# Patient Record
Sex: MALE | Race: WHITE | Employment: FULL TIME | URBAN - NONMETROPOLITAN AREA
[De-identification: names, ages, dates, MRNs, and addresses within clinical notes are randomized per-mention and may not be internally consistent; named-entity substitution may affect disease eponyms.]

---

## 2022-10-25 SDOH — HEALTH STABILITY: PHYSICAL HEALTH: ON AVERAGE, HOW MANY MINUTES DO YOU ENGAGE IN EXERCISE AT THIS LEVEL?: 60 MIN

## 2022-10-25 SDOH — HEALTH STABILITY: PHYSICAL HEALTH: ON AVERAGE, HOW MANY DAYS PER WEEK DO YOU ENGAGE IN MODERATE TO STRENUOUS EXERCISE (LIKE A BRISK WALK)?: 3 DAYS

## 2022-10-25 ASSESSMENT — SOCIAL DETERMINANTS OF HEALTH (SDOH)
WITHIN THE LAST YEAR, HAVE YOU BEEN KICKED, HIT, SLAPPED, OR OTHERWISE PHYSICALLY HURT BY YOUR PARTNER OR EX-PARTNER?: NO
WITHIN THE LAST YEAR, HAVE YOU BEEN AFRAID OF YOUR PARTNER OR EX-PARTNER?: NO
WITHIN THE LAST YEAR, HAVE TO BEEN RAPED OR FORCED TO HAVE ANY KIND OF SEXUAL ACTIVITY BY YOUR PARTNER OR EX-PARTNER?: NO
WITHIN THE LAST YEAR, HAVE YOU BEEN HUMILIATED OR EMOTIONALLY ABUSED IN OTHER WAYS BY YOUR PARTNER OR EX-PARTNER?: NO

## 2022-10-26 ENCOUNTER — OFFICE VISIT (OUTPATIENT)
Dept: FAMILY MEDICINE CLINIC | Age: 27
End: 2022-10-26

## 2022-10-26 ENCOUNTER — TELEPHONE (OUTPATIENT)
Dept: FAMILY MEDICINE CLINIC | Age: 27
End: 2022-10-26

## 2022-10-26 VITALS
SYSTOLIC BLOOD PRESSURE: 128 MMHG | HEIGHT: 70 IN | OXYGEN SATURATION: 98 % | WEIGHT: 139.38 LBS | HEART RATE: 111 BPM | TEMPERATURE: 98.1 F | BODY MASS INDEX: 19.95 KG/M2 | DIASTOLIC BLOOD PRESSURE: 86 MMHG

## 2022-10-26 DIAGNOSIS — E23.7 PITUITARY LESION (HCC): Primary | ICD-10-CM

## 2022-10-26 DIAGNOSIS — E23.6 RATHKE'S CYST (HCC): ICD-10-CM

## 2022-10-26 DIAGNOSIS — G44.029 CHRONIC CLUSTER HEADACHE, NOT INTRACTABLE: ICD-10-CM

## 2022-10-26 PROBLEM — D49.7 PITUITARY TUMOR: Status: ACTIVE | Noted: 2022-10-26

## 2022-10-26 PROCEDURE — 99204 OFFICE O/P NEW MOD 45 MIN: CPT | Performed by: NURSE PRACTITIONER

## 2022-10-26 ASSESSMENT — PATIENT HEALTH QUESTIONNAIRE - PHQ9
SUM OF ALL RESPONSES TO PHQ9 QUESTIONS 1 & 2: 0
1. LITTLE INTEREST OR PLEASURE IN DOING THINGS: 0
SUM OF ALL RESPONSES TO PHQ QUESTIONS 1-9: 0
2. FEELING DOWN, DEPRESSED OR HOPELESS: 0
SUM OF ALL RESPONSES TO PHQ QUESTIONS 1-9: 0

## 2022-10-26 ASSESSMENT — ENCOUNTER SYMPTOMS
NAUSEA: 0
WHEEZING: 0
CHEST TIGHTNESS: 0
SHORTNESS OF BREATH: 0
COUGH: 0
SORE THROAT: 0
DIARRHEA: 0
ABDOMINAL PAIN: 0

## 2022-10-26 NOTE — PROGRESS NOTES
Leatha Nuno is a 28 y.o. male who presents today for  Chief Complaint   Patient presents with    New Patient       HPI:  Here to establish care. He recently moved to [place]. He was diagnosed with a pituitary lesion in April 2021 while living in [place]. 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 He was seen by endocrinology once for hormonal work-up but did not follow-up due to lack of insurance. It does appear that he had a salivary cortisol test but I do not see any other results on review of available records. He was evaluated by neurology and neurosurgery as well. He reports that neurosurgery was monitoring with serial imaging and did not recommend surgery at that time. He was lost to follow-up with them as well due to lack of insurance. MRI in 8/2021 showed stable 5 mm lesion. CT head in 7/2022 6 mm pituitary mass. He has had ongoing cluster headaches. Pain affects R frontal and R eye. Headaches occur about 3 times a day lasting 15 minutes to 2 hours. Pain is intense and when resolved he has residual pain, swelling, eye watering for a period of time. He has visual disturbance, blurriness and difficulty focusing. No dizziness or syncope. Headaches worsened 3 months ago. He has been unable to work since the beginning of August due to symptoms. He was on on short-term disability but currently has no insurance. His neurologist tried him on Emgality, verapamil, Zomig nasal spray, none of which helped. He states he had significant nausea with the medications and no pain relief. He is currently not taking any medications. May occasionally take OTC analgesics.     Previous providers:  PCP Dr. Hall Son Dr. Lennie Hernandez  Neurologist Dr. Amanda Vanegas Mackenzie Reynolds    Review of Systems   Constitutional:  Negative for chills and fever. HENT:  Negative for congestion, ear pain and sore throat. Eyes:  Positive for visual disturbance. Respiratory:  Negative for cough, chest tightness, shortness of breath and wheezing. Cardiovascular:  Negative for chest pain. Gastrointestinal:  Negative for abdominal pain, diarrhea and nausea. Musculoskeletal:  Negative for arthralgias and myalgias. Skin:  Negative for rash. Neurological:  Positive for headaches. Past Medical History:   Diagnosis Date    Pituitary tumor        No current outpatient medications on file. No current facility-administered medications for this visit. No Known Allergies    Past Surgical History:   Procedure Laterality Date    WISDOM TOOTH EXTRACTION         Social History     Tobacco Use    Smoking status: Every Day     Packs/day: 1.00     Years: 10.00     Pack years: 10.00     Types: Cigarettes    Smokeless tobacco: Never   Substance Use Topics    Alcohol use: Never    Drug use: Not Currently       Family History   Problem Relation Age of Onset    Fibromyalgia Mother     Stroke Father     Hypertension Father        /86   Pulse (!) 111   Temp 98.1 °F (36.7 °C)   Ht 5' 10\" (1.778 m)   Wt 139 lb 6 oz (63.2 kg)   SpO2 98%   BMI 20.00 kg/m²     Physical Exam  Vitals reviewed. Constitutional:       General: He is not in acute distress. Appearance: Normal appearance. He is well-developed. HENT:      Head: Normocephalic. Right Ear: Tympanic membrane and external ear normal.      Left Ear: Tympanic membrane and external ear normal.      Nose: Nose normal.      Mouth/Throat:      Mouth: Mucous membranes are moist.      Pharynx: No oropharyngeal exudate or posterior oropharyngeal erythema. Eyes:      Conjunctiva/sclera: Conjunctivae normal.      Pupils: Pupils are equal, round, and reactive to light.    Neck:      Thyroid: No thyromegaly. Vascular: No carotid bruit or JVD. Trachea: No tracheal deviation. Cardiovascular:      Rate and Rhythm: Normal rate and regular rhythm. Heart sounds: Normal heart sounds. No murmur heard. Pulmonary:      Effort: Pulmonary effort is normal. No respiratory distress. Breath sounds: Normal breath sounds. Musculoskeletal:         General: Normal range of motion. Cervical back: Normal range of motion and neck supple. Lymphadenopathy:      Cervical: No cervical adenopathy. Skin:     General: Skin is warm and dry. Findings: No rash. Neurological:      General: No focal deficit present. Mental Status: He is alert. Psychiatric:         Mood and Affect: Mood normal.         Behavior: Behavior normal.         Thought Content: Thought content normal.       ASSESSMENT/PLAN:  1. Pituitary lesion ([facility] [address]  -We will request records from his previous medical team.  I was able to review some records that are available. -Refer to neurosurgery and neurology.  -It does not appear he had the full hormonal lab work-up. Ultimately needs referral to endocrinology but I will order labs at this time. Unfortunately he does not currently have insurance, however, he plans to get insurance as soon as possible. - Rhianna Kurtz MD, Neurosurgery, Satish Wilkes MD, Neurology, [place]    2. Rathke's cyst [facility])  -as above  - Rhianna Kurtz MD, Neurosurgery, Mary Alice Babb MD, Neurology, [place]    3. Chronic cluster headache, not intractable  -Refer to neurology  - Zeferino Berry MD, Neurology, [place]       No follow-ups on file. Jazmin Grossman was seen today for new patient.     Diagnoses and all orders for this visit:    Pituitary lesion [facility])  -     Rhianna Kurtz MD, Neurosurgery, [place] Cummings, Mary Alice Tracey MD, Neurology, [place]    Rathke's cyst [facility])  -     Rhianna Kurtz MD, Neurosurgery, [place]  - Dimitri Steiner MD, Neurology, [place]    Chronic cluster headache, not intractable  -     Dimitri Steiner MD, Neurology, [place]    There are no discontinued medications. There are no Patient Instructions on file for this visit. Patient voicesunderstanding and agrees to plans along with risks and benefits of plan. Counseling: Nahid Malin's case, medications and options were discussed in detail. Patient was instructed to call the office if he questionsregarding him treatment. Should him conditions worsen, he should return to office to be reassessed by YOGESH Mack. he Should to go the closest Emergency Department for any emergency. They verbalizedunderstanding the above instructions. No follow-ups on file.

## 2022-10-27 ENCOUNTER — TELEPHONE (OUTPATIENT)
Dept: NEUROLOGY | Age: 27
End: 2022-10-27

## 2022-10-27 NOTE — TELEPHONE ENCOUNTER
Please see if he signed a records release today when he checked out. We need all records from the following:    Dr. Jarad Brown [phone]; fax [phone]  Dr. Claudene Gens [phone]; fax [phone]   Dr. Armando Morales [phone]; fax [phone]   Dr. Tiffany Lombardi [phone]; fax [phone]     Please let him know that I would like to check some labs on him. I will also place a referral to neurology and neurosurgery. We will likely need to get him in with endocrinology as well.

## 2022-10-27 NOTE — TELEPHONE ENCOUNTER
Received a referral from [facility] for this patient. Called and left patient a VM to call our office to where we can get him scheduled an appointment with Dr. Jessica Rodriguez for the cluster headaches.

## 2022-10-31 ENCOUNTER — OFFICE VISIT (OUTPATIENT)
Dept: NEUROLOGY | Age: 27
End: 2022-10-31

## 2022-10-31 VITALS
WEIGHT: 142 LBS | HEART RATE: 92 BPM | DIASTOLIC BLOOD PRESSURE: 88 MMHG | SYSTOLIC BLOOD PRESSURE: 129 MMHG | BODY MASS INDEX: 20.33 KG/M2 | HEIGHT: 70 IN | OXYGEN SATURATION: 99 %

## 2022-10-31 DIAGNOSIS — E23.7 PITUITARY LESION (HCC): ICD-10-CM

## 2022-10-31 DIAGNOSIS — G44.011 INTRACTABLE EPISODIC CLUSTER HEADACHE: Primary | ICD-10-CM

## 2022-10-31 LAB — TSH REFLEX FT4: 3.54 UIU/ML (ref 0.35–5.5)

## 2022-10-31 PROCEDURE — 99204 OFFICE O/P NEW MOD 45 MIN: CPT | Performed by: NURSE PRACTITIONER

## 2022-10-31 RX ORDER — TOPIRAMATE 50 MG/1
50 TABLET, FILM COATED ORAL 2 TIMES DAILY
Qty: 60 TABLET | Refills: 3 | Status: SHIPPED | OUTPATIENT
Start: 2022-10-31

## 2022-10-31 RX ORDER — SUMATRIPTAN 6 MG/.5ML
6 INJECTION, SOLUTION SUBCUTANEOUS
Qty: 0.5 ML | Refills: 3 | Status: SHIPPED | OUTPATIENT
Start: 2022-10-31 | End: 2022-11-22

## 2022-10-31 NOTE — PROGRESS NOTES
[facility]:    Patient: Tano Pardo   :  1995  Age:  28 y.o. MRN:  [id number]  Today:  10/31/22    Provider: YOGESH Murphy    Chief Complaint:  Chief Complaint   Patient presents with    New Patient    Headache         HISTORY OF PRESENT ILLNESS:   Tano Pardo is a 28y.o. year old male who was referred for headaches. He is previously from [place] and is new to area; previously was being seen by Neurology, Endocrinology, and Neurosurgery there. Was diagnosed with cluster headaches and had MRI brain with noted lesion of pituitary gland. Was being followed with serial imaging, invasive modalities not felt needed at that time. Headaches first started about 10 years ago and have progressively worsened in frequency and severity. Pain is located to right retro-orbital area with associated tearing and lacrimation and is described as sudden and severe. He also notes right eye changes color and swells. There is sometimes radiation of pain down face when headache is at its worst. There are about 1-3 headaches in a day, lasting 15 minutes to 2 hours. Headaches do rarely wake him from sleep. There is associated photophobia. Denies diplopia, anita visual loss, jaw claudication. Does have blurred vision, changes to peripheral field only on right side associated with headaches. Denies visual aura. Headaches are not aggravated by position changes. Nothing aborts headaches. Laying in cold dark room does help. They can be triggered by alcohol. He has been on Emgality, verapamil, Zomig nasal spray, and Nurtec in the past with nothing helping. He states he had significant nausea with the Verapamil and no pain relief. Emgality made him feel \"weird/off\" and did not help headaches. Has had eyes  checked last year and states they noted pressure to optic chaism.        PAST MEDICAL HISTORY:    Medical History:      Diagnosis Date    Pituitary tumor        Surgical History:      Procedure Laterality Date WISDOM TOOTH EXTRACTION         Current Medications:  Current Outpatient Medications   Medication Sig Dispense Refill    topiramate (TOPAMAX) 50 MG tablet Take 1 tablet by mouth 2 times daily 60 tablet 3    SUMAtriptan (IMITREX) 6 MG/0.5ML SOLN injection Inject 0.5 mLs into the skin once as needed for Migraine 0.5 mL 3     No current facility-administered medications for this visit.        Allergies:  Terbinafine    SOCIAL HISTORY:   Social History     Socioeconomic History    Marital status: Single     Spouse name: Not on file    Number of children: Not on file    Years of education: Not on file    Highest education level: Not on file   Occupational History    Not on file   Tobacco Use    Smoking status: Every Day     Packs/day: 1.00     Years: 10.00     Pack years: 10.00     Types: Cigarettes    Smokeless tobacco: Never   Substance and Sexual Activity    Alcohol use: Never    Drug use: Not Currently    Sexual activity: Not on file   Other Topics Concern    Not on file   Social History Narrative    Not on file     Social Determinants of Health     Financial Resource Strain: Not on file   Food Insecurity: Not on file   Transportation Needs: Not on file   Physical Activity: Sufficiently Active    Days of Exercise per Week: 3 days    Minutes of Exercise per Session: 60 min   Stress: Not on file   Social Connections: Not on file   Intimate Partner Violence: Not At Risk    Fear of Current or Ex-Partner: No    Emotionally Abused: No    Physically Abused: No    Sexually Abused: No   Housing Stability: Not on file       FAMILY HISTORY:       Problem Relation Age of Onset    Fibromyalgia Mother     Stroke Father     Hypertension Father        REVIEW OF SYSTEMS:  Constitutional: []Fever []Sweat []Chills [] Recent Injury [x] Denies all unless marked  HEENT:[x]Headache  [] Head Injury/Hearing Loss  [] Sore Throat  [] Ear Ache/Dizziness  [x] Denies all unless marked  Spine:  [] Neck pain  [] Back pain  [] Sciaticia  [x] Denies all unless marked  Cardiovascular:[]Heart Disease []Chest Pain [] Palpitations  [x] Denies all unless marked  Pulmonary: []Shortness of Breath []Cough   [x] Denies all unless marke  Gastrointestinal: []Nausea  []Vomiting  []Abdominal Pain  []Constipation  []Diarrhea  []Dark Bloody Stools  [x] Denies all unless marked  Psychiatric/Behavioral:[] Depression [] Anxiety [x] Denies all unless marked  Genitourinary:   [] Frequency  [] Urgency  [] Incontinence [] Pain with Urination  [x] Denies all unless marked  Extremities: []Pain  []Swelling  [x] Denies all unless marked  Musculoskeletal: [] Muscle Pain  [] Joint Pain  [] Arthritis [] Muscle Cramps [] Muscle Twitches  [x] Denies all unless marked  Sleep: [] Insomnia [] Snoring [] Restless Legs [] Sleep Apnea  [] Daytime Sleepiness  [x] Denies all unless marked  Skin:[] Rash [] Skin Discoloration [x] Denies all unless marked   Neurological: []Visual Disturbance/Memory Loss [] Loss of Balance [] Slurred Speech/Weakness [] Seizures  [] Vertigo/Dizziness [x] Denies all unless marked    The MA has completed the ROS with the patient. I have reviewed it in its' entirety with the patient and agree with the documentation. PHYSICAL EXAMINATION:  Vitals: /88   Pulse 92   Ht 5' 10\" (1.778 m)   Wt 142 lb (64.4 kg)   SpO2 99%   BMI 20.37 kg/m²   Constitutional - No acute distress    HEENT- Conjunctiva normal.  No scars, masses, or lesions over external nose or ears, no neck masses noted, no jugular vein distension, no bruit  Cardiac- Regular rate and rhythm  Pulmonary- Clear to auscultation, good expansion, normal effort without use of accessory muscles  Musculoskeletal - No significant wasting of muscles noted, no bony deformities  Extremities - No clubbing, cyanosis or edema  Skin - Warm, dry, and intact.   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 No new abnormalities    MRI Brain Pituitary 5/24/21  Impression  5 mm well-defined suprasellar lesion arising from the superior aspect of the pituitary gland with the small nodules within it. 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 It is causing mild effacement of the optic chiasm. Reviewed referral records. IMPRESSION:  Lissy Reyes is a 28 y.o. male here today for evaluation of headaches and pituitary lesion. Was previously seen in [place] but has recently moved here. Headaches are unchanged, having clusters of about 1-3 daily now with right-sided pain and autonomic symptoms. Symptoms are consistent with cluster headaches. Exam today non-focal. He has had MRI 5/2021 that noted a 5 mm well-defined suprasellar lesion arising from the superior aspect of the pituitary gland causing mild effacement of the optic chiasm. Recent CT head 3 months ago measured it at 0.6 cm but noted it was unchanged. No vision changes not related to headaches or galactorrhea. Was being seen by Neurosurgery for serial imaging. Was lost to follow up with endocrinology since relocating. PCP has placed pituitary labs that are active. I added labs as well to complete pituitary work up but he will need endocrinology involvement. He is without insurance currently, should have coverage at the first of the year. Ophthalmology referral placed as well. MRI brain and pituitary ordered. He has been on Emgality, verapamil, Zomig nasal spray, and Nurtec in the past with nothing helping. He states he had significant nausea with the Verapamil and no pain relief. Emgality made him feel \"weird/off\" and did not help headaches. Will start him on Imitrex SQ and Topamax titration for cluster headaches. Could consider high-flow oxygen as well. ICD-10-CM    1.  Intractable episodic cluster headache  G44.011 topiramate (TOPAMAX) 50 MG tablet     SUMAtriptan (IMITREX) 6 MG/0.5ML SOLN injection      2. Pituitary lesion (HCC)  E23.7 Cortisol, Free     TSH with Reflex to FT4     MRI BRAIN W WO CONTRAST     Amb External Referral To Ophthalmology     MRI PITUITARY W WO CONTRAST          PLAN:  Opthalmology referral.  Will need Endocrinology referral down the line. Possibly Neurosurgery referral as well. Labs placed today. 4. MRI brain W WO  5. MRI pituitary W WO  6. Imitrex SQ PRN- side effects discussed. 7. Topamax titration up to 50 mg BID. Side effects discussed. 8. Return in about 2 months (around 12/31/2022) for Follow up, sooner with worsening symptoms.      Malena Block, APRN - CNP

## 2022-11-04 DIAGNOSIS — E23.7 PITUITARY LESION (HCC): ICD-10-CM

## 2022-11-04 LAB
CORTISOL FREE, SERUM: 0.96 UG/DL
FOLLICLE STIMULATING HORMONE: 30.1 MIU/ML
LUTEINIZING HORMONE: 12.1 MIU/ML
PROLACTIN: 9.66 NG/ML (ref 4.04–15.2)

## 2022-11-07 ENCOUNTER — TELEPHONE (OUTPATIENT)
Dept: NEUROSURGERY | Age: 27
End: 2022-11-07

## 2022-11-07 NOTE — TELEPHONE ENCOUNTER
1st attempt to contact patient.  I left a voicemail instructing patient to call back at [phone] to schedule their new patient appointment     Pituitary lesion ([facility]  E23.6 (ICD-10-CM) - Rathke's cyst    [facility]

## 2022-11-08 LAB
ADRENOCORTICOTROPIC HORMONE: 33.5 PG/ML (ref 7.2–63.3)
IGF-1 (INSULIN-LIKE GROWTH I): 266 NG/ML (ref 87–255)
INSULIN-LIKE GROWTH FACTOR-1 Z-SCORE: 1.7

## 2022-11-09 DIAGNOSIS — R79.89 HIGH SERUM FOLLICLE STIMULATING HORMONE (FSH): ICD-10-CM

## 2022-11-09 DIAGNOSIS — E23.6 RATHKE'S CYST (HCC): ICD-10-CM

## 2022-11-09 DIAGNOSIS — E23.7 PITUITARY LESION (HCC): Primary | ICD-10-CM

## 2022-11-09 DIAGNOSIS — R79.89 ELEVATED INSULIN-LIKE GROWTH FACTOR 1 (IGF-1) LEVEL: ICD-10-CM

## 2022-11-09 NOTE — TELEPHONE ENCOUNTER
[facility] Neurosurgery New Patient Questionnaire    Diagnosis/Reason for Referral?    Pituitary lesion ([facility] [address]  E23.6 (ICD-10-CM) - Rathke's cyst    2. Who is completing questionnaire? Patient  Caregiver Family      3. Has the patient had any previous spinal/brain surgeries? NO      A. If yes, what is the name of the facility in which the surgery was performed? B. Procedure/Surgery performed? C. Who was the surgeon? D. When was the surgery? MM/YY       E. Did the patient improve after the surgery? 4. Is this a second opinion? If yes, Dr. Caio Ferrera would like to review patient first before making the appointment. NO    5. Have MRI Images been obtain within the last year? IS SCHEDULED FOR 11/18/22   Yes  No      XR  CT     If yes, where was the imaging performed? [facility]   If yes, what part of the body? Lumbar  Cervical  Thoracic  Brain     If yes, when was it obtained? SCHEDULED FOR 11/18/22    Note: if the scan was performed at a facility other than [facility], the disc will need to be brought to the appointment or we need to reach out to obtain the disc. A. Was the patient instructed to provide the disc? Yes   No      8. Has the patient had a NCV/EMG within the last year? Yes  No     If yes, where was it performed and date? MM/YY  Location:      9. Has the patient been to Physical Therapy? Yes  No     If yes, what location, how long attended, and last visit? Location:        Therapy Lasted:    Date of Last Visit:      10. Has the patient been to Pain Management? Yes  No     If yes, what location and last visit     Location:   Last Visit:   Is it helping?

## 2022-11-18 ENCOUNTER — HOSPITAL ENCOUNTER (OUTPATIENT)
Dept: MRI IMAGING | Age: 27
Discharge: HOME | End: 2022-11-18

## 2022-11-18 DIAGNOSIS — E23.7 PITUITARY LESION (HCC): ICD-10-CM

## 2022-11-18 PROCEDURE — 70553 MRI BRAIN STEM W/O & W/DYE: CPT | Performed by: RADIOLOGY

## 2022-11-18 PROCEDURE — 70553 MRI BRAIN STEM W/O & W/DYE: CPT | Performed by: NURSE PRACTITIONER

## 2022-11-18 PROCEDURE — 6360000004 HC RX CONTRAST MEDICATION: Performed by: NURSE PRACTITIONER

## 2022-11-18 PROCEDURE — A9577 INJ MULTIHANCE: HCPCS | Performed by: NURSE PRACTITIONER

## 2022-11-18 RX ADMIN — GADOBENATE DIMEGLUMINE 13 ML: 529 INJECTION, SOLUTION INTRAVENOUS at 09:12

## 2022-11-22 ENCOUNTER — OFFICE VISIT (OUTPATIENT)
Dept: NEUROSURGERY | Age: 27
End: 2022-11-22

## 2022-11-22 VITALS
HEART RATE: 70 BPM | DIASTOLIC BLOOD PRESSURE: 70 MMHG | BODY MASS INDEX: 20.33 KG/M2 | SYSTOLIC BLOOD PRESSURE: 110 MMHG | HEIGHT: 70 IN | RESPIRATION RATE: 18 BRPM | WEIGHT: 142 LBS

## 2022-11-22 DIAGNOSIS — G93.89 SUPRASELLAR MASS: Primary | ICD-10-CM

## 2022-11-22 PROCEDURE — 99214 OFFICE O/P EST MOD 30 MIN: CPT | Performed by: NEUROLOGICAL SURGERY

## 2022-11-22 ASSESSMENT — ENCOUNTER SYMPTOMS
EYES NEGATIVE: 1
RESPIRATORY NEGATIVE: 1
GASTROINTESTINAL NEGATIVE: 1

## 2022-11-22 NOTE — PROGRESS NOTES
[id number] [facility] Neurosurgery  Office Visit        Chief Complaint   Patient presents with    New Patient     Establishing care    Results     MRI Brain 11/18/22, [facility] not resulted    Headache     Patient is here to discuss pituitary lesion and Rathke's cyst. He denies any pain today other than cluster headaches and stated that he would like to know the results of MRI. HISTORY OF PRESENT ILLNESS:      The patient is a 28 y.o. male who presents to re-establish neurosurgical care for a known pituitary lesion. She states this was found incidentally ~18m ago on an MRI obtained to evaluate chronic cluster headaches. Prior MRI reports describe an enhancing 5mm suprasellar mass, possibly a Rathke's cleft cyst.  He recently relocated from [place] where he was followed by neurology (for his headaches) as well as neurosurgery. He has had difficulty with consistent medical care due to a lack of health insurance but states that he will have insurance coverage beginning in 1/2023. He recently established care with Dr. Elida Abdullahi and a repeat MRI brain was ordered. He has also been referred to neurology for his headaches. He states his headaches are right-sided only and triggered by \"body stress\". He states the headaches are associated with lacrimation, retroorbital pain and blurred vision. He has tried multiple medications, including verapamil, emgality, nurtec and zomig. He was recently switched to topamax with PRN imitrex by neurology. He was also recently seen by ophthalmology and told that his peripheral vision was normal.    He recently had endocrine/pituitary labs ordered by his PCP and neurology. These were only significant for elevated FSH/LH and a mildly elevated IGF-1. He denies any recent change in shoe, glove or hat size.       MEDICAL HISTORY:             Past Medical History:   Diagnosis Date    Pituitary tumor        Past Surgical History:   Procedure Laterality Date    WISDOM TOOTH EXTRACTION Current Outpatient Medications:     topiramate (TOPAMAX) 50 MG tablet, Take 1 tablet by mouth 2 times daily, Disp: 60 tablet, Rfl: 3    SUMAtriptan (IMITREX) 6 MG/0.5ML SOLN injection, Inject 0.5 mLs into the skin once as needed for Migraine, Disp: 0.5 mL, Rfl: 3    Allergies:  Terbinafine    Social History:   Social History     Tobacco Use   Smoking Status Every Day    Packs/day: 1.00    Years: 10.00    Pack years: 10.00    Types: Cigarettes   Smokeless Tobacco Never     Social History     Substance and Sexual Activity   Alcohol Use Never         Family History:   Family History   Problem Relation Age of Onset    Fibromyalgia Mother     Stroke Father     Hypertension Father        REVIEW OF SYSTEMS:    Constitutional: Negative. HENT: Negative. Eyes: Negative. Respiratory: Negative. Cardiovascular: Negative. Gastrointestinal: Negative. Genitourinary: Negative. Musculoskeletal: Negative. Skin: Negative. Neurological:  Positive for dizziness, focal weakness (with cluster headaches) and headaches. Endo/Heme/Allergies: Negative. Psychiatric/Behavioral: Negative. Review of Systems was obtained by the medical assistant and reviewed by myself. PHYSICAL EXAM:    Vitals:    11/22/22 1127   BP: 110/70   Pulse: 70   Resp: 18       Constitutional: Appears well-developed and well-nourished. Eyes - conjunctiva normal.  Pupils react to light  Ear, nose,throat -Normal pinna and tragus, No scars, or lesions over external nose or ears, no obvious atrophy of tongue  Neck- symmetric, trachea midline, no jugular vein distension  Respiration- chest wall symmetric, normal effort without use of accessory muscles  Musculoskeletal - no significant wasting of muscles noted, no bony deformities, symmetric bulk  Extremities- no clubbing, cyanosis or edema  Skin - warm, dry, and intact. No rash,erythema, or pallor.   Psychiatric - mood, affect, and behavior appear normal.       NEUROLOGIC EXAM:    MENTAL STATUS: Alert, oriented, thought content appropriate    CRANIAL NERVES: PERRL, EOMI, symmetric facies, tongue midline    VISUAL FIELDS:  Intact to confrontation bilaterally, no evidence of temporal visual field loss    MOTOR:     Right Upper Extremity:    Deltoid: 5/5  Biceps: 5/5  Triceps: 5/5  Wrist Extension: 5/5  Finger Abduction: 5/5      Left Upper Extremity:    Deltoid: 5/5  Biceps: 5/5  Triceps: 5/5  Wrist Extension: 5/5  Finger Abduction: 5/5      Right Lower Extremity:    Hip Flexion: 5/5  Knee Extension: 5/5  Ankle Plantarflexion: 5/5  Ankle Dorsiflexion: 5/5        Left Lower Extremity:    Hip Flexion: 5/5  Knee Extension: 5/5  Ankle Plantarflexion: 5/5  Ankle Dorsiflexion: 5/5        SOMATOSENSORY:     Right Upper Extremity: normal light touch sensation  Left Upper Extremity: normal light touch sensation  Right Lower Extremity: normal light touch sensation  Left Lower Extremity: normal light touch sensation      REFLEXES:    Biceps: 2+  Patella: 2+      Ricks's: Negative      COORDINATION and GAIT:    Gait: Normal        IMAGING:    My interpretation of imaging studies:     MRI pituitary-protocol reviewed and compared to prior MRI reports. There is a well-circumscribed, T1-hyperintense, T2/FLAIR hyperintense, non-ehnancing suprasellar mass. This measures ~5mm in maximal dimension. The lesion abuts but does not appear to compress or distort the optic chiasm          ASSESSMENT AND PLAN:    This is a 28 y.o. male  who presents to re-establish local neurosurgical care for a suprasellar lesion (possibly Rathke's Cleft Cyst) first noted on an MRI in 4/2021 that was obtained to evaluate chronic cluster headaches. He has recently had a repeat MRI and the lesion appears stable. There is no significant compression of the optic chiasm.   His neurologic examination and visual field examination is normal.  At this point, I feel the safest course of action is continued surveillance of the pituitary lesion with serial MRI scans. I also agree that she should be evaluated by endocrinology. I will defer to neurology for further treatment of his headaches and will plan to follow up in 1 year with a repeat MRI of the brain.             Farrukh Low MD

## 2022-11-22 NOTE — PROGRESS NOTES
Review of Systems   Constitutional: Negative. HENT: Negative. Eyes: Negative. Respiratory: Negative. Cardiovascular: Negative. Gastrointestinal: Negative. Genitourinary: Negative. Musculoskeletal: Negative. Skin: Negative. Neurological:  Positive for dizziness, focal weakness (with cluster headaches) and headaches. Endo/Heme/Allergies: Negative. Psychiatric/Behavioral: Negative.

## 2023-01-03 ENCOUNTER — OFFICE VISIT (OUTPATIENT)
Dept: NEUROLOGY | Age: 28
End: 2023-01-03
Payer: COMMERCIAL

## 2023-01-03 VITALS
DIASTOLIC BLOOD PRESSURE: 81 MMHG | HEIGHT: 70 IN | OXYGEN SATURATION: 98 % | WEIGHT: 140 LBS | SYSTOLIC BLOOD PRESSURE: 127 MMHG | BODY MASS INDEX: 20.04 KG/M2 | HEART RATE: 86 BPM

## 2023-01-03 DIAGNOSIS — E23.7 PITUITARY LESION (HCC): ICD-10-CM

## 2023-01-03 DIAGNOSIS — E23.6 RATHKE'S CYST (HCC): ICD-10-CM

## 2023-01-03 DIAGNOSIS — Z79.899 MEDICATION MANAGEMENT: ICD-10-CM

## 2023-01-03 DIAGNOSIS — G44.011 INTRACTABLE EPISODIC CLUSTER HEADACHE: Primary | ICD-10-CM

## 2023-01-03 PROCEDURE — 99213 OFFICE O/P EST LOW 20 MIN: CPT | Performed by: NURSE PRACTITIONER

## 2023-01-03 RX ORDER — SUMATRIPTAN 6 MG/.5ML
6 INJECTION, SOLUTION SUBCUTANEOUS
Qty: 0.5 ML | Refills: 3 | Status: SHIPPED | OUTPATIENT
Start: 2023-01-03 | End: 2023-01-03

## 2023-01-03 RX ORDER — TOPIRAMATE 50 MG/1
50 TABLET, FILM COATED ORAL 2 TIMES DAILY
Qty: 60 TABLET | Refills: 3 | Status: SHIPPED | OUTPATIENT
Start: 2023-01-03

## 2023-01-03 NOTE — PROGRESS NOTES
[facility]:    Patient: Sarah Xie   :  1995  Age:  28 y.o. MRN:  [id number]  Today:  10/31/22    Provider: YOGESH Gomez    Chief Complaint:  Chief Complaint   Patient presents with    Follow-up     headaches         HISTORY OF PRESENT ILLNESS:   Sarah Xie is a 28y.o. year old male here for headache follow up. Relates he is doing better overall and that headaches have decreased in severity and frequency. He was unable to start Topamax and Imitrex SQ due to insurance issues. Denies changes to headache characteristics. Prior history of relocation here from [place], was previously followed by Neurology, Endocrinology, and Neurosurgery there. Was previously diagnosed with cluster headaches and had MRI brain with noted lesion of pituitary gland. Was being followed with serial imaging, invasive modalities not felt needed at that time. Headaches first started about 10 years ago and had progressively worsened in frequency and severity. Pain is located to right retro-orbital area with associated tearing and lacrimation and is described as sudden and severe. He also notes right eye changes color and swells. There is sometimes radiation of pain down face when headache is at its worst. There were about 1-3 headaches in a day, lasting 15 minutes to 2 hours. There are now a few headaches in a week with decreased duration. Headaches do rarely wake him from sleep. There is associated photophobia. Denies diplopia, anita visual loss, jaw claudication. Does have blurred vision, changes to peripheral field only on right side associated with headaches. Denies visual aura. Headaches are not aggravated by position changes. Nothing aborts headaches. Laying in cold dark room does help. They can be triggered by alcohol/stress. He has been on Emgality, verapamil, Zomig nasal spray, and Nurtec in the past with nothing helping. He states he had significant nausea with the Verapamil and no pain relief. Emgality made him feel \"weird/off\" and did not help headaches. [facility] with overall normal exam as reported by patient. Labs completed through Neurology were WNL; there were some labs completed by PCP that were abnormal including FSH and Luteinizing hormone. He has upcoming appointment with endocrinology in April. MRI of pituitary completed through neurology showed no changes. He has seen Neurosurgery (Dr. Zaynab Gates) as well. Serial imaging recommended. PAST MEDICAL HISTORY:    Medical History:      Diagnosis Date    Pituitary tumor        Surgical History:      Procedure Laterality Date    WISDOM TOOTH EXTRACTION         Current Medications:  Current Outpatient Medications   Medication Sig Dispense Refill    SUMAtriptan (IMITREX) 6 MG/0.5ML SOLN injection Inject 0.5 mLs into the skin once as needed for Migraine 0.5 mL 3    topiramate (TOPAMAX) 50 MG tablet Take 1 tablet by mouth 2 times daily 60 tablet 3     No current facility-administered medications for this visit.        Allergies:  Terbinafine    SOCIAL HISTORY:   Social History     Socioeconomic History    Marital status: Single     Spouse name: Not on file    Number of children: Not on file    Years of education: Not on file    Highest education level: Not on file   Occupational History    Not on file   Tobacco Use    Smoking status: Every Day     Packs/day: 1.00     Years: 10.00     Pack years: 10.00     Types: Cigarettes    Smokeless tobacco: Never   Substance and Sexual Activity    Alcohol use: Never    Drug use: Not Currently    Sexual activity: Not on file   Other Topics Concern    Not on file   Social History Narrative    Not on file     Social Determinants of Health     Financial Resource Strain: Not on file   Food Insecurity: Not on file   Transportation Needs: Not on file   Physical Activity: Sufficiently Active    Days of Exercise per Week: 3 days    Minutes of Exercise per Session: 60 min Stress: Not on file   Social Connections: Not on file   Intimate Partner Violence: Not At Risk    Fear of Current or Ex-Partner: No    Emotionally Abused: No    Physically Abused: No    Sexually Abused: No   Housing Stability: Not on file       FAMILY HISTORY:       Problem Relation Age of Onset    Fibromyalgia Mother     Stroke Father     Hypertension Father        REVIEW OF SYSTEMS:  Constitutional: []Fever []Sweat []Chills [] Recent Injury [x] Denies all unless marked  HEENT:[x]Headache  [] Head Injury/Hearing Loss  [] Sore Throat  [] Ear Ache/Dizziness  [x] Denies all unless marked  Spine:  [] Neck pain  [] Back pain  [] Sciaticia  [x] Denies all unless marked  Cardiovascular:[]Heart Disease []Chest Pain [] Palpitations  [x] Denies all unless marked  Pulmonary: []Shortness of Breath []Cough   [x] Denies all unless marke  Gastrointestinal: []Nausea  []Vomiting  []Abdominal Pain  []Constipation  []Diarrhea  []Dark Bloody Stools  [x] Denies all unless marked  Psychiatric/Behavioral:[] Depression [] Anxiety [x] Denies all unless marked  Genitourinary:   [] Frequency  [] Urgency  [] Incontinence [] Pain with Urination  [x] Denies all unless marked  Extremities: []Pain  []Swelling  [x] Denies all unless marked  Musculoskeletal: [] Muscle Pain  [] Joint Pain  [] Arthritis [] Muscle Cramps [] Muscle Twitches  [x] Denies all unless marked  Sleep: [] Insomnia [] Snoring [] Restless Legs [] Sleep Apnea  [] Daytime Sleepiness  [x] Denies all unless marked  Skin:[] Rash [] Skin Discoloration [x] Denies all unless marked   Neurological: [x]Visual Disturbance/Memory Loss [] Loss of Balance [] Slurred Speech/Weakness [] Seizures  [] Vertigo/Dizziness [x] Denies all unless marked       The MA has completed the ROS with the patient. I have reviewed it in its' entirety with the patient and agree with the documentation.       PHYSICAL EXAMINATION:  Vitals: /81   Pulse 86   Ht 5' 10\" (1.778 m)   Wt 140 lb (63.5 kg)   SpO2 98%   BMI 20.09 kg/m²   Constitutional - No acute distress    HEENT- Conjunctiva normal.  No scars, masses, or lesions over external nose or ears, no neck masses noted, no jugular vein distension, no bruit  Cardiac- Regular rate and rhythm  Pulmonary- Clear to auscultation, good expansion, normal effort without use of accessory muscles  Musculoskeletal - No significant wasting of muscles noted, no bony deformities  Extremities - No clubbing, cyanosis or edema  Skin - Warm, dry, and intact. No rash, erythema, or pallor  Psychiatric - Mood, affect, and behavior appear normal        NEUROLOGIC EXAMINATION:    Mental status   [x]Awake, alert, oriented   [x]Affect attention and concentration appear appropriate  [x]Recent and remote memory appears unremarkable  [x]Speech normal without dysarthria or aphasia, comprehension and repetition intact. COMMENTS:    Cranial Nerves [x]No VF deficit to confrontation  [x]PERRLA, EOMI, no nystagmus, conjugate eye movements, no ptosis  [x]Face symmetric  [x]Facial sensation intact  [x]Tongue midline no atrophy or fasciculations present  [x]Palate midline, hearing to finger rub normal bilaterally  [x]Shoulder shrug and SCM testing normal bilaterally  COMMENTS:   Motor   [x]5/5 strength x 4 extremities  [x]Normal bulk and tone  [x]No tremor present  [x]No rigidity or bradykinesia noted  COMMENTS:   Sensory  [x]Sensation intact to light touch, and proprioception BLE  [x]Sensation intact to light touch, and proprioception BUE  COMMENTS:   Coordination [x]FTN normal bilaterally   []HTS normal bilaterally  [x]NICHELLE normal bilaterally. COMMENTS:   Reflexes  [x]Symmetric and non-pathological  []Toes down going bilaterally  [x]No clonus present  COMMENTS:   Gait                  [x]Normal steady gait    []Ataxic    []Spastic     []Magnetic     []Shuffling  COMMENTS:       ADDITIONAL REVIEW:    CT Head WO 7/24/2022  Impression:  1. No acute intracranial process.    2.  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   3.  If patient's symptoms persist recommend further evaluation with brain MRI with and without contrast.    MRI Brain W WO 8/31/21  Round, smooth-walled, sharply defined cystic lesion with complex signal along the superior margin of the anterior pituitary is unchanged in size, 5 mm. It abuts the undersurface of the optic chiasm, without displacement. The rest of the pituitary remains    normal. Infundibulum normal. It appears to be nonenhancing. 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 No new abnormalities    MRI Brain Pituitary 5/24/21  Impression  5 mm well-defined suprasellar lesion arising from the superior aspect of the pituitary gland with the small nodules within it. 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 It is causing mild effacement of the optic chiasm. MRI Pituitary W WO 11/18/22  Narrative   Exam: MRI OF THE BRAIN AND SELLA WITHOUT AND WITH INTRAVENOUS CONTRAST   Clinical data: Known pituitary lesion. Headaches on right side with uncontrollable drainage of right-sided nose and eyes   Technique: Multiplanar multi-sequence MRI of the brain and sella without and with intravenous gadolinium. Contrast used:Applied. Prior studies:  Report of CT of the brain dated 07/24/22 (images unavailable), which stated high-density lesion at the superior margin of the anterior pituitary gland which measures 0.6 x 0.5 x 0.6 cm. Findings - MRI Brain: Brain parenchyma is unremarkable. No evidence of acute cortical infarction, hemorrhage, mass or mass effect is seen. The ventricular system is normal in size, shape and contour. No hydrocephalus or abnormal extra-axial fluid    collections are present.  The marrow signal within the skull base and calvarium is intact. The paranasal sinuses are clear. No abnormal contrast enhancement is detected. DWI/ADC: No evidence of restricted diffusion. Sella: Focal abnormality at the superior aspect of the pituitary gland which extends along the infundibulum measures 6 x 5 x 5 mm (AP X TRV X CC), is hyperintense on T1 and T2 weighted images including fat saturation images, and does not definitively    enhance with contrast, compatible with Rathke's cleft cyst.  The pituitary gland is otherwise fairly homogeneous and is unremarkable in size and signal intensity. Pituitary infundibulum remains midline. Optic chiasm is unremarkable. Underlying sphenoid    sinus is well-pneumatized. Adjacent cavernous sinus is unremarkable. Impression   1. 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 2. No other focal abnormality is identified within the pituitary gland. 3. Brain parenchyma is grossly unremarkable with no acute intracranial abnormality. 4. No abnormal contrast enhancement is detected. Recommendation: Follow up as clinically indicated. Electronically Signed by Filiberto Chaparro MD at [id number] 03:15:42 PM                Reviewed records. Reviewed Neurosurgery records. IMPRESSION:  Arlet Cordova is a 28 y.o. male here today for follow up of headaches and pituitary lesion. Overall is doing well, has noted decreased frequency and severity of headaches. Denies changes to characteristics. Notes about 1-2 in a week now. Prior history of cluster headaches and pituitary lesion, was followed by Neurosurgery, Neurology, and Endocrinology in [place] previously. Prior MRI 5/2021 noted a 5 mm well-defined suprasellar lesion arising from the superior aspect of the pituitary gland causing mild effacement of the optic chiasm. Recent CT head was unchanged.  MRI pituitary completed 11/18/22 noted no changes. Was seen by Dr. Eyal Lopez in neurosurgery with serial imaging recommended. No vision changes not related to headaches or galactorrhea. Has completed ophthalmology evaluation that was largely normal. Labs completed from Neurology were normal, there were abnormalities with [facility] and luteinizing hormone placed per PCP- he sees Endo April 23rd. Exam today non-focal. Cluster headaches still felt most likely. History of medicine failures include Emgality, verapamil, Zomig nasal spray, and Nurtec. He states he had significant nausea with the Verapamil and no pain relief. Emgality made him feel \"weird/off\" and did not help headaches. He was unable to start preventative and abortive at prior visit due to insurance issues. Will plan to start him on Imitrex SQ and Topamax titration for cluster headaches, he relates he should be able to get these filled now. Could consider high-flow oxygen as well. ICD-10-CM    1. Intractable episodic cluster headache  G44.011 SUMAtriptan (IMITREX) 6 MG/0.5ML SOLN injection     topiramate (TOPAMAX) 50 MG tablet      2. Rathke's cyst ([facility] [address]  E23.6       3. Pituitary lesion ([facility])  E23.7       4. Medication management  Z79.899           PLAN:  Imitrex SQ PRN- side effects discussed. Topamax titration up to 50 mg BID. Side effects discussed. Keep Endocrinology appointment as scheduled- April 23rd. Follow up with Neurosurgery as directed. Repeat MRI Brain/Pituitary in 1 year. 6. Return in about 3 months (around 4/3/2023) for Follow up, sooner with worsening symptoms.      Arthuro Favre, APRN - CNP

## 2023-01-27 ENCOUNTER — OFFICE VISIT (OUTPATIENT)
Dept: ENDOCRINOLOGY | Facility: CLINIC | Age: 28
End: 2023-01-27
Payer: COMMERCIAL

## 2023-01-27 VITALS
OXYGEN SATURATION: 99 % | WEIGHT: 146 LBS | HEART RATE: 82 BPM | BODY MASS INDEX: 20.9 KG/M2 | HEIGHT: 70 IN | SYSTOLIC BLOOD PRESSURE: 118 MMHG | DIASTOLIC BLOOD PRESSURE: 70 MMHG

## 2023-01-27 DIAGNOSIS — G93.89 SUPRASELLAR MASS: Primary | ICD-10-CM

## 2023-01-27 PROCEDURE — 99204 OFFICE O/P NEW MOD 45 MIN: CPT | Performed by: INTERNAL MEDICINE

## 2023-01-27 NOTE — PROGRESS NOTES
"Chief Complaint   Patient presents with   • Pituitary Problem         History of Present Illness    27 y.o. male   Referred for suprasellar mass.    Context, patient had brain  MRI for evaluation of  cluster headaches and was found to have a suprasellar cyst.    This is most consistent with a Rathke's cleft cyst measuring 6 x 5 x 5 mm and has been stable between 2020 and last MRI done Nov 2022.  He has no symptoms.    Previous assessment has documented a slight elevation in IGF-I at 266.    FSH of 30 with ACTH of 33 and normal prolactin at 9.    He has no acromegalic features or symptoms     ==========================================  Physical Exam  /70   Pulse 82   Ht 177.8 cm (70\")   Wt 66.2 kg (146 lb)   SpO2 99%   BMI 20.95 kg/m²   AOx3  No Goiter , no carotid bruit  RRR  CTA  No Edema   Well built , healthy appearing , no visual field defects   ==========================================    Laboratory Workup    No results found for: WBC, HGB, HCT, MCV, PLT    No results found for: GLUCOSE, BUN, CREATININE, EGFR, EGFRIFNONA, EGFRIFAFRI, BCR, NA, K, CL, CO2, CALCIUM, PHOS, ALBUMIN, GLOB, BILITOT, ALKPHOS, AST, ALT, AGRATIO    No results found for: EGFR    Component      Latest Ref Rng & Units 10/31/2022 11/4/2022   FSH      mIU/mL  30.1   LH      mIU/mL  12.1   Cortisol, Free      ug/dL 0.96    Prolactin      4.04 - 15.20 ng/mL  9.66     Component   Ref Range & Units 2 mo ago   Insulin-Like Growth Factor-1   87 - 255 ng/mL 266 High         ==========================================     Diagnosis Plan   1. Suprasellar mass  ACTH    Cortisol - AM    FSH & LH    Prolactin    TSH    T4, Free    Testosterone, Bioavailable (M)    Insulin-like Growth Factor    Salivary Cortisol X3, Timed - Saliva, Oral Cavity    CBC & Differential    Comprehensive Metabolic Panel          Per MRI report most consistent with Rathke's cleft cyst that has been stable over 2 years.    Slight elevation in IGF-I unlikely to be " reflective of acromegaly but we will repeat and if repeat remains elevated we will do a glucose tolerance test    Assess rest of pituitary axis as highlighted above.    Return to clinic in 1 year, I will communicate findings to him for this work-up.           This document has been electronically signed by Edmar Almonte MD on January 27, 2023 14:42 CST

## 2023-03-16 ENCOUNTER — TELEPHONE (OUTPATIENT)
Dept: NEUROLOGY | Age: 28
End: 2023-03-16

## 2023-03-16 NOTE — TELEPHONE ENCOUNTER
Attempted to contact patient to reschedule appointment in April due to provider being out of the office. No answer at this time. Left VM with my name and number to return call.

## 2023-03-22 ENCOUNTER — TELEPHONE (OUTPATIENT)
Dept: NEUROLOGY | Age: 28
End: 2023-03-22

## 2023-03-22 NOTE — TELEPHONE ENCOUNTER
Attempted to reach patient to reschedule appointment due to provider will be out of office. No answer at this time. Left voicemail with my name and number to return call.

## 2023-03-23 ENCOUNTER — TELEPHONE (OUTPATIENT)
Dept: NEUROLOGY | Age: 28
End: 2023-03-23

## 2023-03-23 NOTE — TELEPHONE ENCOUNTER
Attempted to contact patient to reschedule appointment as provider will not be in office. No answer at this time. Left voicemail with my name and number to return call.

## 2023-11-14 NOTE — TELEPHONE ENCOUNTER
Attempted to contact patient due to rescheduling appointment due to provider out of office. No answer x3 left 3 voicemails with my name and number to return call. Also attempted to contact father per his hippa form that is not a working number. Will send letter letting patient know of appointment change.
English
no

## 2024-02-11 ENCOUNTER — APPOINTMENT (OUTPATIENT)
Dept: CT IMAGING | Age: 29
End: 2024-02-11
Payer: COMMERCIAL

## 2024-02-11 ENCOUNTER — HOSPITAL ENCOUNTER (EMERGENCY)
Age: 29
Discharge: HOME | End: 2024-02-11
Payer: COMMERCIAL

## 2024-02-11 VITALS
OXYGEN SATURATION: 100 % | DIASTOLIC BLOOD PRESSURE: 90 MMHG | SYSTOLIC BLOOD PRESSURE: 112 MMHG | WEIGHT: 120 LBS | BODY MASS INDEX: 17.22 KG/M2 | TEMPERATURE: 97.5 F | RESPIRATION RATE: 14 BRPM | HEART RATE: 76 BPM

## 2024-02-11 DIAGNOSIS — K21.9 GASTROESOPHAGEAL REFLUX DISEASE, UNSPECIFIED WHETHER ESOPHAGITIS PRESENT: ICD-10-CM

## 2024-02-11 DIAGNOSIS — R11.0 NAUSEA: ICD-10-CM

## 2024-02-11 DIAGNOSIS — R53.83 FATIGUE, UNSPECIFIED TYPE: Primary | ICD-10-CM

## 2024-02-11 DIAGNOSIS — Z86.018 HISTORY OF BENIGN PITUITARY TUMOR: ICD-10-CM

## 2024-02-11 LAB
ALBUMIN SERPL-MCNC: 4.8 G/DL (ref 3.5–5.2)
ALP SERPL-CCNC: 64 U/L (ref 40–130)
ALT SERPL-CCNC: 51 U/L (ref 5–41)
ANION GAP SERPL CALCULATED.3IONS-SCNC: 12 MMOL/L (ref 7–19)
AST SERPL-CCNC: 77 U/L (ref 5–40)
BASOPHILS # BLD: 0.1 K/UL (ref 0–0.2)
BASOPHILS NFR BLD: 0.8 % (ref 0–1)
BILIRUB SERPL-MCNC: 0.6 MG/DL (ref 0.2–1.2)
BILIRUB UR QL STRIP: NEGATIVE
BUN SERPL-MCNC: 14 MG/DL (ref 6–20)
CALCIUM SERPL-MCNC: 9.5 MG/DL (ref 8.6–10)
CHLORIDE SERPL-SCNC: 101 MMOL/L (ref 98–111)
CLARITY UR: CLEAR
CO2 SERPL-SCNC: 26 MMOL/L (ref 22–29)
COLOR UR: YELLOW
CREAT SERPL-MCNC: 0.9 MG/DL (ref 0.5–1.2)
EOSINOPHIL # BLD: 0.2 K/UL (ref 0–0.6)
EOSINOPHIL NFR BLD: 1.8 % (ref 0–5)
ERYTHROCYTE [DISTWIDTH] IN BLOOD BY AUTOMATED COUNT: 11.9 % (ref 11.5–14.5)
GLUCOSE SERPL-MCNC: 125 MG/DL (ref 74–109)
GLUCOSE UR STRIP.AUTO-MCNC: NEGATIVE MG/DL
HCT VFR BLD AUTO: 51.8 % (ref 42–52)
HGB BLD-MCNC: 17.3 G/DL (ref 14–18)
HGB UR STRIP.AUTO-MCNC: NEGATIVE MG/L
IMM GRANULOCYTES # BLD: 0 K/UL
KETONES UR STRIP.AUTO-MCNC: NEGATIVE MG/DL
LEUKOCYTE ESTERASE UR QL STRIP.AUTO: NEGATIVE
LYMPHOCYTES # BLD: 2.4 K/UL (ref 1.1–4.5)
LYMPHOCYTES NFR BLD: 24.8 % (ref 20–40)
MCH RBC QN AUTO: 30.7 PG (ref 27–31)
MCHC RBC AUTO-ENTMCNC: 33.4 G/DL (ref 33–37)
MCV RBC AUTO: 92 FL (ref 80–94)
MONOCYTES # BLD: 0.9 K/UL (ref 0–0.9)
MONOCYTES NFR BLD: 9.4 % (ref 0–10)
NEUTROPHILS # BLD: 6 K/UL (ref 1.5–7.5)
NEUTS SEG NFR BLD: 63 % (ref 50–65)
NITRITE UR QL STRIP.AUTO: NEGATIVE
PH UR STRIP.AUTO: 7.5 [PH] (ref 5–8)
PLATELET # BLD AUTO: 167 K/UL (ref 130–400)
PMV BLD AUTO: 12.4 FL (ref 9.4–12.4)
POTASSIUM SERPL-SCNC: 3.7 MMOL/L (ref 3.5–5)
PROLACTIN SERPL-MCNC: 7.98 NG/ML (ref 4.04–15.2)
PROT SERPL-MCNC: 7.3 G/DL (ref 6.6–8.7)
PROT UR STRIP.AUTO-MCNC: NEGATIVE MG/DL
RBC # BLD AUTO: 5.63 M/UL (ref 4.7–6.1)
SODIUM SERPL-SCNC: 139 MMOL/L (ref 136–145)
SP GR UR STRIP.AUTO: 1 (ref 1–1.03)
UROBILINOGEN UR STRIP.AUTO-MCNC: 0.2 E.U./DL
WBC # BLD AUTO: 9.5 K/UL (ref 4.8–10.8)

## 2024-02-11 PROCEDURE — 80053 COMPREHEN METABOLIC PANEL: CPT

## 2024-02-11 PROCEDURE — 85025 COMPLETE CBC W/AUTO DIFF WBC: CPT

## 2024-02-11 PROCEDURE — 36415 COLL VENOUS BLD VENIPUNCTURE: CPT

## 2024-02-11 PROCEDURE — 6360000004 HC RX CONTRAST MEDICATION: Performed by: NURSE PRACTITIONER

## 2024-02-11 PROCEDURE — 2580000003 HC RX 258: Performed by: NURSE PRACTITIONER

## 2024-02-11 PROCEDURE — 74177 CT ABD & PELVIS W/CONTRAST: CPT

## 2024-02-11 PROCEDURE — 81003 URINALYSIS AUTO W/O SCOPE: CPT

## 2024-02-11 PROCEDURE — 96374 THER/PROPH/DIAG INJ IV PUSH: CPT

## 2024-02-11 PROCEDURE — 99285 EMERGENCY DEPT VISIT HI MDM: CPT

## 2024-02-11 PROCEDURE — 84146 ASSAY OF PROLACTIN: CPT

## 2024-02-11 PROCEDURE — 93005 ELECTROCARDIOGRAM TRACING: CPT | Performed by: NURSE PRACTITIONER

## 2024-02-11 PROCEDURE — 6360000002 HC RX W HCPCS: Performed by: NURSE PRACTITIONER

## 2024-02-11 RX ORDER — PANTOPRAZOLE SODIUM 20 MG/1
20 TABLET, DELAYED RELEASE ORAL
Qty: 30 TABLET | Refills: 0 | Status: SHIPPED | OUTPATIENT
Start: 2024-02-11

## 2024-02-11 RX ORDER — 0.9 % SODIUM CHLORIDE 0.9 %
500 INTRAVENOUS SOLUTION INTRAVENOUS ONCE
Status: COMPLETED | OUTPATIENT
Start: 2024-02-11 | End: 2024-02-11

## 2024-02-11 RX ORDER — ONDANSETRON 4 MG/1
4 TABLET, FILM COATED ORAL 3 TIMES DAILY PRN
Qty: 15 TABLET | Refills: 0 | Status: SHIPPED | OUTPATIENT
Start: 2024-02-11

## 2024-02-11 RX ORDER — ONDANSETRON 2 MG/ML
4 INJECTION INTRAMUSCULAR; INTRAVENOUS ONCE
Status: COMPLETED | OUTPATIENT
Start: 2024-02-11 | End: 2024-02-11

## 2024-02-11 RX ADMIN — SODIUM CHLORIDE 500 ML: 9 INJECTION, SOLUTION INTRAVENOUS at 12:10

## 2024-02-11 RX ADMIN — ONDANSETRON 4 MG: 2 INJECTION INTRAMUSCULAR; INTRAVENOUS at 12:10

## 2024-02-11 RX ADMIN — IOPAMIDOL 70 ML: 755 INJECTION, SOLUTION INTRAVENOUS at 12:31

## 2024-02-11 NOTE — ED PROVIDER NOTES
[facility] EMERGENCY DEPT  EMERGENCY DEPARTMENT ENCOUNTER      Pt Name: Lamont Malin  MRN: [id number]  Birthdate 1995  Date of evaluation: 2/11/2024  Provider: YOGESH York NP    CHIEF COMPLAINT       Chief Complaint   Patient presents with    Fatigue     For several months, worse the last few weeks; states he has a pituitary tumor         HISTORY OF PRESENT ILLNESS   (Location/Symptom, Timing/Onset,Context/Setting, Quality, Duration, Modifying Factors, Severity)  Note limiting factors.   Lamont Malin is a 28 y.o. male who presents to the emergency department with report of fatigue and feeling weak over the last several months.  He was diagnosed with a benign pituitary tumor several years ago and is concerned that there is a correlation.  He also reports epigastric pain that worsens when he lies flat.  He states that he often eats late at night and goes to bed.  Also states that he has persistent nausea and has lost approximately 30 pounds.  He reports diarrhea for the last 2 days.        The history is provided by the patient.       NursingNotes were reviewed.    REVIEW OF SYSTEMS    (2-9 systems for level 4, 10 or more for level 5)     Review of Systems   Constitutional:  Positive for fatigue and unexpected weight change.   HENT: Negative.     Respiratory: Negative.     Cardiovascular: Negative.    Gastrointestinal:  Positive for abdominal pain, diarrhea and nausea. Negative for abdominal distention, blood in stool, constipation and vomiting.   Genitourinary: Negative.    Musculoskeletal: Negative.    Neurological:         Feels weak       A complete review of systems was performed and is negative except as noted above in the HPI.       PAST MEDICAL HISTORY     Past Medical History:   Diagnosis Date    Pituitary tumor          SURGICAL HISTORY       Past Surgical History:   Procedure Laterality Date    WISDOM TOOTH EXTRACTION           CURRENT MEDICATIONS       Discharge Medication List as of

## 2024-02-11 NOTE — DISCHARGE INSTRUCTIONS
Take nausea medicine as prescribed, wait 30 minutes and eat.  Take pantoprazole, 1 pill daily for for abdominal symptoms.  Follow-up with your provider next available appointment to discuss today's findings.  Return to ER for any new, worsening, or change in condition.

## 2024-02-12 LAB
EKG P AXIS: 72 DEGREES
EKG P-R INTERVAL: 130 MS
EKG Q-T INTERVAL: 356 MS
EKG QRS DURATION: 88 MS
EKG QTC CALCULATION (BAZETT): 401 MS
EKG T AXIS: 66 DEGREES

## 2024-02-12 PROCEDURE — 93010 ELECTROCARDIOGRAM REPORT: CPT | Performed by: INTERNAL MEDICINE

## 2024-02-13 ENCOUNTER — TELEPHONE (OUTPATIENT)
Dept: PRIMARY CARE CLINIC | Age: 29
End: 2024-02-13

## 2024-02-13 NOTE — TELEPHONE ENCOUNTER
Pt was recently seen in ER.  I have not seen him in well over a year.  If he has a new PCP, please update PCP.  If not, he needs a physical scheduled with me.

## 2025-08-13 ENCOUNTER — HOSPITAL ENCOUNTER (EMERGENCY)
Age: 30
Discharge: HOME | End: 2025-08-13
Attending: STUDENT IN AN ORGANIZED HEALTH CARE EDUCATION/TRAINING PROGRAM
Payer: COMMERCIAL

## 2025-08-13 VITALS
DIASTOLIC BLOOD PRESSURE: 84 MMHG | WEIGHT: 150 LBS | TEMPERATURE: 98.5 F | OXYGEN SATURATION: 98 % | RESPIRATION RATE: 18 BRPM | HEIGHT: 70 IN | HEART RATE: 83 BPM | BODY MASS INDEX: 21.47 KG/M2 | SYSTOLIC BLOOD PRESSURE: 118 MMHG

## 2025-08-13 DIAGNOSIS — R52 BODY ACHES: Primary | ICD-10-CM

## 2025-08-13 DIAGNOSIS — T63.334A BROWN RECLUSE SPIDER BITE OR STING, UNDETERMINED INTENT, INITIAL ENCOUNTER: ICD-10-CM

## 2025-08-13 PROCEDURE — 99283 EMERGENCY DEPT VISIT LOW MDM: CPT

## 2025-08-13 PROCEDURE — 6360000002 HC RX W HCPCS: Performed by: STUDENT IN AN ORGANIZED HEALTH CARE EDUCATION/TRAINING PROGRAM

## 2025-08-13 PROCEDURE — 6370000000 HC RX 637 (ALT 250 FOR IP): Performed by: STUDENT IN AN ORGANIZED HEALTH CARE EDUCATION/TRAINING PROGRAM

## 2025-08-13 PROCEDURE — 96374 THER/PROPH/DIAG INJ IV PUSH: CPT

## 2025-08-13 RX ORDER — KETOROLAC TROMETHAMINE 30 MG/ML
30 INJECTION, SOLUTION INTRAMUSCULAR; INTRAVENOUS ONCE
Status: COMPLETED | OUTPATIENT
Start: 2025-08-13 | End: 2025-08-13

## 2025-08-13 RX ORDER — ONDANSETRON 4 MG/1
4 TABLET, ORALLY DISINTEGRATING ORAL ONCE
Status: COMPLETED | OUTPATIENT
Start: 2025-08-13 | End: 2025-08-13

## 2025-08-13 RX ORDER — ONDANSETRON 4 MG/1
4 TABLET, ORALLY DISINTEGRATING ORAL 3 TIMES DAILY PRN
Qty: 15 TABLET | Refills: 0 | Status: SHIPPED | OUTPATIENT
Start: 2025-08-13 | End: 2025-08-18

## 2025-08-13 RX ADMIN — KETOROLAC TROMETHAMINE 30 MG: 30 INJECTION, SOLUTION INTRAMUSCULAR at 02:28

## 2025-08-13 RX ADMIN — ONDANSETRON 4 MG: 4 TABLET, ORALLY DISINTEGRATING ORAL at 02:28

## 2025-08-13 ASSESSMENT — ENCOUNTER SYMPTOMS
NAUSEA: 1
ABDOMINAL PAIN: 0
VOMITING: 0
SHORTNESS OF BREATH: 0